# Patient Record
Sex: FEMALE | Race: WHITE | NOT HISPANIC OR LATINO | Employment: UNEMPLOYED | URBAN - NONMETROPOLITAN AREA
[De-identification: names, ages, dates, MRNs, and addresses within clinical notes are randomized per-mention and may not be internally consistent; named-entity substitution may affect disease eponyms.]

---

## 2023-11-15 ENCOUNTER — OFFICE VISIT (OUTPATIENT)
Dept: CARDIOLOGY | Facility: CLINIC | Age: 68
End: 2023-11-15
Payer: COMMERCIAL

## 2023-11-15 VITALS
DIASTOLIC BLOOD PRESSURE: 80 MMHG | SYSTOLIC BLOOD PRESSURE: 117 MMHG | HEART RATE: 88 BPM | HEIGHT: 61 IN | OXYGEN SATURATION: 96 % | BODY MASS INDEX: 24.35 KG/M2 | WEIGHT: 129 LBS

## 2023-11-15 DIAGNOSIS — R00.1 BRADYCARDIA: Primary | ICD-10-CM

## 2023-11-15 PROBLEM — C44.42 SQUAMOUS CELL CARCINOMA OF SKIN OF SCALP AND NECK: Status: ACTIVE | Noted: 2021-12-07

## 2023-11-15 PROBLEM — E78.5 DYSLIPIDEMIA: Status: ACTIVE | Noted: 2023-04-22

## 2023-11-15 PROBLEM — G43.909 MIGRAINES: Status: ACTIVE | Noted: 2023-11-15

## 2023-11-15 PROBLEM — K21.9 GASTROESOPHAGEAL REFLUX DISEASE: Status: ACTIVE | Noted: 2023-04-22

## 2023-11-15 PROBLEM — F31.9 BIPOLAR I DISORDER (MULTI): Chronic | Status: ACTIVE | Noted: 2023-09-22

## 2023-11-15 PROBLEM — N18.32 STAGE 3B CHRONIC KIDNEY DISEASE (MULTI): Status: ACTIVE | Noted: 2023-04-22

## 2023-11-15 PROBLEM — R29.6 FALLS FREQUENTLY: Status: ACTIVE | Noted: 2022-04-16

## 2023-11-15 PROBLEM — F90.2 ATTENTION DEFICIT HYPERACTIVITY DISORDER (ADHD), COMBINED TYPE: Status: ACTIVE | Noted: 2021-10-26

## 2023-11-15 PROBLEM — F41.9 ANXIETY: Status: ACTIVE | Noted: 2023-11-15

## 2023-11-15 PROBLEM — Z85.828 PERSONAL HISTORY OF OTHER MALIGNANT NEOPLASM OF SKIN: Status: ACTIVE | Noted: 2021-12-17

## 2023-11-15 PROBLEM — F32.A DEPRESSION: Status: ACTIVE | Noted: 2022-04-05

## 2023-11-15 PROBLEM — R53.1 WEAKNESS: Status: ACTIVE | Noted: 2023-05-04

## 2023-11-15 PROBLEM — R53.82 CHRONIC FATIGUE: Status: ACTIVE | Noted: 2023-11-15

## 2023-11-15 PROBLEM — I10 BENIGN ESSENTIAL HYPERTENSION: Status: ACTIVE | Noted: 2023-04-22

## 2023-11-15 PROBLEM — Z98.84 STATUS POST BARIATRIC SURGERY: Status: ACTIVE | Noted: 2021-12-07

## 2023-11-15 PROBLEM — G47.00 INSOMNIA: Status: ACTIVE | Noted: 2023-09-22

## 2023-11-15 PROCEDURE — 3079F DIAST BP 80-89 MM HG: CPT | Performed by: STUDENT IN AN ORGANIZED HEALTH CARE EDUCATION/TRAINING PROGRAM

## 2023-11-15 PROCEDURE — 99204 OFFICE O/P NEW MOD 45 MIN: CPT | Performed by: STUDENT IN AN ORGANIZED HEALTH CARE EDUCATION/TRAINING PROGRAM

## 2023-11-15 PROCEDURE — 1159F MED LIST DOCD IN RCRD: CPT | Performed by: STUDENT IN AN ORGANIZED HEALTH CARE EDUCATION/TRAINING PROGRAM

## 2023-11-15 PROCEDURE — 3074F SYST BP LT 130 MM HG: CPT | Performed by: STUDENT IN AN ORGANIZED HEALTH CARE EDUCATION/TRAINING PROGRAM

## 2023-11-15 PROCEDURE — 93010 ELECTROCARDIOGRAM REPORT: CPT | Performed by: STUDENT IN AN ORGANIZED HEALTH CARE EDUCATION/TRAINING PROGRAM

## 2023-11-15 PROCEDURE — 1036F TOBACCO NON-USER: CPT | Performed by: STUDENT IN AN ORGANIZED HEALTH CARE EDUCATION/TRAINING PROGRAM

## 2023-11-15 RX ORDER — ATORVASTATIN CALCIUM 40 MG/1
40 TABLET, FILM COATED ORAL NIGHTLY
COMMUNITY

## 2023-11-15 RX ORDER — BUPROPION HYDROCHLORIDE 150 MG/1
TABLET ORAL
COMMUNITY
Start: 2020-11-23

## 2023-11-15 RX ORDER — OMEPRAZOLE 20 MG/1
20 CAPSULE, DELAYED RELEASE ORAL
COMMUNITY
Start: 2023-06-30

## 2023-11-15 RX ORDER — CALCITRIOL 0.5 UG/1
0.5 CAPSULE ORAL
COMMUNITY
Start: 2023-11-07

## 2023-11-15 RX ORDER — TALC
POWDER (GRAM) TOPICAL
COMMUNITY
Start: 2023-04-22

## 2023-11-15 RX ORDER — FLUOXETINE HYDROCHLORIDE 40 MG/1
40 CAPSULE ORAL
COMMUNITY
Start: 2022-10-05

## 2023-11-15 RX ORDER — ONDANSETRON 4 MG/1
4 TABLET, ORALLY DISINTEGRATING ORAL EVERY 6 HOURS PRN
COMMUNITY
Start: 2023-05-26

## 2023-11-15 RX ORDER — CARIPRAZINE 1.5 MG/1
1.5 CAPSULE, GELATIN COATED ORAL NIGHTLY
COMMUNITY

## 2023-11-15 RX ORDER — AMLODIPINE BESYLATE 10 MG/1
10 TABLET ORAL DAILY
COMMUNITY

## 2023-11-15 RX ORDER — DIAZEPAM 5 MG/1
1 TABLET ORAL EVERY 12 HOURS PRN
COMMUNITY
Start: 2023-06-12

## 2023-11-15 RX ORDER — DEXTROAMPHETAMINE SACCHARATE, AMPHETAMINE ASPARTATE, DEXTROAMPHETAMINE SULFATE AND AMPHETAMINE SULFATE 2.5; 2.5; 2.5; 2.5 MG/1; MG/1; MG/1; MG/1
10 TABLET ORAL 2 TIMES DAILY
COMMUNITY
Start: 2023-11-06 | End: 2023-11-15

## 2023-11-15 RX ORDER — IBANDRONATE SODIUM 150 MG/1
1 TABLET, FILM COATED ORAL
COMMUNITY
Start: 2023-11-07

## 2023-11-15 NOTE — PATIENT INSTRUCTIONS
It was nice meeting you today  Please repeat echo after 2 years for the aortic regurgitation (leakage in the aortic valve)

## 2023-11-15 NOTE — PROGRESS NOTES
Primary Care Physician: Musa Rodríguez MD   Date of Visit: 11/15/2023  2:20 PM EST  Type of Visit: new      Chief Complaint:  No chief complaint on file.       HPI  Addie Ortiz 68 y.o. female with past medical history of GERD presents today for abnormal echo showing moderate AI per report.  Per report it has been unchanged from prior.       No dizziness or syncope  No chest pain   No sob  No angina  No le edema  No ortohpnea or pnd   Exsmoker quit 20 years ago  No alcohol       Review of Systems   Review of Systems   12 points review of systems are negative expect for the above    Social History:  Social History     Socioeconomic History    Marital status:      Spouse name: Not on file    Number of children: Not on file    Years of education: Not on file    Highest education level: Not on file   Occupational History    Not on file   Tobacco Use    Smoking status: Not on file    Smokeless tobacco: Not on file   Substance and Sexual Activity    Alcohol use: Not on file    Drug use: Not on file    Sexual activity: Not on file   Other Topics Concern    Not on file   Social History Narrative    Not on file     Social Determinants of Health     Financial Resource Strain: Not on file   Food Insecurity: Not on file   Transportation Needs: Not on file   Physical Activity: Not on file   Stress: Not on file   Social Connections: Not on file   Intimate Partner Violence: Not on file   Housing Stability: Not on file        Past Medical History:  Past Medical History:   Diagnosis Date    Encounter for general adult medical examination without abnormal findings 06/30/2021    Visit for preventive health examination    Personal history of other diseases of the musculoskeletal system and connective tissue     History of joint pain    Personal history of other diseases of the respiratory system 11/16/2021    History of allergic rhinitis    Personal history of other specified conditions 04/14/2021    History of  "chronic fatigue       Past Surgical History:  Past Surgical History:   Procedure Laterality Date    OTHER SURGICAL HISTORY  03/15/2021    Tubal ligation    OTHER SURGICAL HISTORY  03/15/2021    Hysterectomy    OTHER SURGICAL HISTORY  03/15/2021    Gastric bypass surgery       Family History:  No family history on file.     Objective:       6/30/2021     4:29 PM 7/29/2021    11:34 AM 8/30/2021    11:04 AM 9/16/2021     2:40 PM 11/16/2021    10:04 AM 11/30/2021     2:56 PM 12/7/2021    10:56 AM   Vitals   Systolic 142 138 132 142 136 132 110   Diastolic 82 88 86 94 84 78 76   Heart Rate 62 92 102 104 106 86 90   Temp       36.5 °C (97.7 °F)   Resp       16   Height (in) 1.568 m (5' 1.75\") 1.568 m (5' 1.75\") 1.568 m (5' 1.75\") 1.568 m (5' 1.75\") 1.568 m (5' 1.75\") 1.568 m (5' 1.75\") 1.532 m (5' 0.32\")   Weight (lb) 162.13 162 163 166 172 172 169.97   BMI 29.89 kg/m2 29.87 kg/m2 30.05 kg/m2 30.61 kg/m2 31.71 kg/m2 31.71 kg/m2 32.85 kg/m2   BSA (m2) 1.79 m2 1.79 m2 1.79 m2 1.81 m2 1.84 m2 1.84 m2 1.81 m2      Constitutional:       Appearance: Healthy appearance. Not in distress.   Neck:      Vascular: No JVR. JVD normal.   Pulmonary:      Effort: Pulmonary effort is normal.      Breath sounds: Normal breath sounds. No wheezing. No rhonchi. No rales.   Chest:      Chest wall: Not tender to palpatation.   Cardiovascular:      PMI at left midclavicular line. Normal rate. Regular rhythm. Normal S1. Normal S2.       Murmurs: There is no murmur.      No gallop.  No click. No rub.   Pulses:     Intact distal pulses.   Edema:     Peripheral edema absent.   Abdominal:      General: Bowel sounds are normal.      Palpations: Abdomen is soft.      Tenderness: There is no abdominal tenderness.   Musculoskeletal: Normal range of motion.         General: No tenderness.   Skin:     General: Skin is warm and dry.   Neurological:      General: No focal deficit present.      Mental Status: Alert and oriented to person, place and time. "     Allergies:  Not on File    Medications:  No current outpatient medications     Labs and Imaging:     Lab Results   Component Value Date    WBC 13.7 (H) 08/25/2022    HGB 11.4 (L) 08/25/2022    HCT 34.4 (L) 08/25/2022     08/25/2022    ALT 18 07/22/2021    AST 20 07/22/2021     08/25/2022    K 4.4 08/25/2022     08/25/2022    CREATININE 1.2 08/25/2022    BUN 13 08/25/2022    CO2 24 08/25/2022    TSH 1.87 07/22/2021         Echocardiogram: No results found for this or any previous visit from the past 1825 days.    Stress Testing: No results found for this or any previous visit from the past 1825 days.    Cardiac Catheterization: No results found for this or any previous visit from the past 1825 days.    Cardiac Scoring: No results found for this or any previous visit from the past 1825 days.    AAA : No results found for this or any previous visit from the past 1825 days.    OTHER: No results found for this or any previous visit from the past 1825 days.      CTA from [facility]  Heart, pericardium, and thoracic vessels:   Stable ectasia of ascending aorta measuring up to 3.9 cm descending   thoracic aorta is normal in caliber.  No abnormal dilatation of main   pulmonary artery.  Heart is not enlarged.  Calcification of coronary   artery.  No pericardial effusion.  Calcification of aortic arch.       AAA screen  RESULT:     AORTA (AP x TV):        Proximal: 2.5 cm x 2.8 cm        Mid (level of renal arteries): 2.0 cm x 2.3 cm        Distal: 1.4 cm x 1.8 cm     Common Iliac Arteries (AP x TV):        Right: 1.2 cm x 1.3 cm        Left: 1.1 cm x 1.1 cm     Atherosclerotic plaque: present       The ASCVD Risk score (Amanda DK, et al., 2019) failed to calculate for the following reasons:    Cannot find a previous HDL lab    Cannot find a previous total cholesterol lab    The smoking status is invalid     Assessment/Plan:   No diagnosis found.   Addie Angel 68 y.o. female with past medical history of  GERD, HTN, ADHD, depression, anxiety, exsmoker, s/p left nephrectomy and gastric bypass surgery presents today for moderate AI per echo has been stable over past 2 echos, trileaflet valve, no aortic stenosis, normal aorta size. EF 60-65, normal LV dimensions.  (No images)  CT chest shows ascending aorta upper limit of normal 3.9, coronary calcification.     She is asymptomatic   Normal BP and normal pulse pressure     Plan  Agree with HI Statin  Repeat echo after 2 years, she will call after 2 years or follow up with her PCP, she is not sure yet     Time Spent: I spent minutes reviewing medical testing, obtaining medical history and counselling and educating on diagnosis and documenting clinical encounter.         ____________________________________________________________  Gavin Huang MD   of Medicine  Division of Cardiovascular Medicine   [facility] [place]  [facility]

## 2023-11-16 ENCOUNTER — HOSPITAL ENCOUNTER (OUTPATIENT)
Dept: CARDIOLOGY | Facility: HOSPITAL | Age: 68
Discharge: HOME | End: 2023-11-16
Payer: MEDICARE

## 2023-11-16 DIAGNOSIS — R00.1 BRADYCARDIA: ICD-10-CM

## 2023-11-16 PROCEDURE — 93005 ELECTROCARDIOGRAM TRACING: CPT

## 2023-11-22 LAB
ATRIAL RATE: 86 BPM
P AXIS: 46 DEGREES
P OFFSET: 202 MS
P ONSET: 156 MS
PR INTERVAL: 128 MS
Q ONSET: 220 MS
QRS COUNT: 14 BEATS
QRS DURATION: 82 MS
QT INTERVAL: 362 MS
QTC CALCULATION(BAZETT): 433 MS
QTC FREDERICIA: 408 MS
R AXIS: 74 DEGREES
T AXIS: 74 DEGREES
T OFFSET: 401 MS
VENTRICULAR RATE: 86 BPM

## 2024-07-16 ENCOUNTER — APPOINTMENT (OUTPATIENT)
Dept: UROLOGY | Facility: CLINIC | Age: 69
End: 2024-07-16
Payer: MEDICARE

## 2024-07-31 ENCOUNTER — APPOINTMENT (OUTPATIENT)
Dept: UROLOGY | Facility: CLINIC | Age: 69
End: 2024-07-31
Payer: MEDICARE

## 2024-07-31 VITALS — BODY MASS INDEX: 24.35 KG/M2 | HEIGHT: 61 IN | WEIGHT: 129 LBS

## 2024-07-31 DIAGNOSIS — C64.2 MALIGNANT NEOPLASM OF LEFT KIDNEY (MULTI): Primary | ICD-10-CM

## 2024-07-31 PROCEDURE — 1126F AMNT PAIN NOTED NONE PRSNT: CPT | Performed by: SURGERY

## 2024-07-31 PROCEDURE — 99203 OFFICE O/P NEW LOW 30 MIN: CPT | Performed by: SURGERY

## 2024-07-31 PROCEDURE — 1036F TOBACCO NON-USER: CPT | Performed by: SURGERY

## 2024-07-31 PROCEDURE — 1159F MED LIST DOCD IN RCRD: CPT | Performed by: SURGERY

## 2024-07-31 PROCEDURE — 3008F BODY MASS INDEX DOCD: CPT | Performed by: SURGERY

## 2024-07-31 RX ORDER — TRETINOIN 0.5 MG/G
CREAM TOPICAL
COMMUNITY
Start: 2024-04-29

## 2024-07-31 RX ORDER — TRAZODONE HYDROCHLORIDE 100 MG/1
100 TABLET ORAL NIGHTLY
COMMUNITY
Start: 2024-07-05

## 2024-07-31 RX ORDER — TRIAMCINOLONE ACETONIDE 1 MG/G
OINTMENT TOPICAL
COMMUNITY
Start: 2024-06-10

## 2024-07-31 RX ORDER — TOPIRAMATE 50 MG/1
1 TABLET, FILM COATED ORAL
COMMUNITY
Start: 2024-07-19

## 2024-07-31 RX ORDER — MONTELUKAST SODIUM 10 MG/1
10 TABLET ORAL NIGHTLY
COMMUNITY
Start: 2023-09-25

## 2024-07-31 RX ORDER — DEXTROAMPHETAMINE SACCHARATE, AMPHETAMINE ASPARTATE, DEXTROAMPHETAMINE SULFATE AND AMPHETAMINE SULFATE 2.5; 2.5; 2.5; 2.5 MG/1; MG/1; MG/1; MG/1
1 TABLET ORAL
COMMUNITY
Start: 2024-07-05

## 2024-07-31 ASSESSMENT — PAIN SCALES - GENERAL: PAINLEVEL: 0-NO PAIN

## 2024-07-31 NOTE — PROGRESS NOTES
Subjective   Patient ID: Addie Ortiz is a 68 y.o. female who presents for Renal Cancer.    HPI  She is well-known to me from my previous practice.  She has a history of left-sided renal cell carcinoma.  She had a radical nephrectomy in August 2022.  Her final stage was T1b.  Since then she has been doing reasonably well.  She has been depressed due to the recent loss of her .  She also has been having issues with balance.  She is addressing this with her primary doctor as well as neurology.  She reports a stable weight.  She denies any flank or abdominal pain.    Review of Systems  As per HPI, remaining 10 systems negative      Objective   Physical Exam  Well nourished  Breathing comfortably  Mildly obese, mid line scar  Abdomen soft, ND, NT            Assessment/Plan   Problem List Items Addressed This Visit    None  Visit Diagnoses         Codes    Malignant neoplasm of left kidney (Multi)    -  Primary C64.2    Relevant Orders    CBC    Comprehensive metabolic panel    CT chest abdomen pelvis wo IV contrast    Follow Up In Urology          Renal cell carcinoma.     Clinically she is stable.  I reviewed her most recent labs.  Her creatinine is only mildly elevated but stable.  She is mildly anemic, but attributes this to iron deficiency.  Her liver function is normal.  She is due for surveillance imaging.  I have ordered a CT chest abdomen and pelvis today.  I will call her with the results.    She will return and see me in 6 months time.        Davey Ruff MD 07/31/24 3:49 PM

## 2025-01-20 ENCOUNTER — HOSPITAL ENCOUNTER (OUTPATIENT)
Dept: RADIOLOGY | Facility: HOSPITAL | Age: 70
Discharge: HOME | End: 2025-01-20
Payer: MEDICARE

## 2025-01-20 ENCOUNTER — HOSPITAL ENCOUNTER (EMERGENCY)
Facility: HOSPITAL | Age: 70
Discharge: HOME | End: 2025-01-20
Attending: EMERGENCY MEDICINE
Payer: MEDICARE

## 2025-01-20 VITALS
BODY MASS INDEX: 26.7 KG/M2 | OXYGEN SATURATION: 95 % | DIASTOLIC BLOOD PRESSURE: 98 MMHG | SYSTOLIC BLOOD PRESSURE: 179 MMHG | HEIGHT: 60 IN | RESPIRATION RATE: 18 BRPM | HEART RATE: 66 BPM | TEMPERATURE: 96.8 F | WEIGHT: 136 LBS

## 2025-01-20 DIAGNOSIS — R52 PAIN, UNSPECIFIED: ICD-10-CM

## 2025-01-20 DIAGNOSIS — I80.8 SUPERFICIAL THROMBOPHLEBITIS OF RIGHT UPPER EXTREMITY: Primary | ICD-10-CM

## 2025-01-20 DIAGNOSIS — M79.89 PAIN AND SWELLING OF FOREARM, RIGHT: ICD-10-CM

## 2025-01-20 DIAGNOSIS — M79.631 PAIN AND SWELLING OF FOREARM, RIGHT: ICD-10-CM

## 2025-01-20 PROCEDURE — 93971 EXTREMITY STUDY: CPT | Performed by: RADIOLOGY

## 2025-01-20 PROCEDURE — 93971 EXTREMITY STUDY: CPT

## 2025-01-20 PROCEDURE — 99283 EMERGENCY DEPT VISIT LOW MDM: CPT | Performed by: EMERGENCY MEDICINE

## 2025-01-20 RX ORDER — OXYCODONE HYDROCHLORIDE 5 MG/1
5 TABLET ORAL EVERY 6 HOURS PRN
Qty: 15 TABLET | Refills: 0 | Status: SHIPPED | OUTPATIENT
Start: 2025-01-20 | End: 2025-01-27

## 2025-01-20 ASSESSMENT — PAIN DESCRIPTION - PAIN TYPE: TYPE: ACUTE PAIN

## 2025-01-20 ASSESSMENT — PAIN DESCRIPTION - LOCATION
LOCATION: ARM
LOCATION: ARM

## 2025-01-20 ASSESSMENT — COLUMBIA-SUICIDE SEVERITY RATING SCALE - C-SSRS
1. IN THE PAST MONTH, HAVE YOU WISHED YOU WERE DEAD OR WISHED YOU COULD GO TO SLEEP AND NOT WAKE UP?: NO
2. HAVE YOU ACTUALLY HAD ANY THOUGHTS OF KILLING YOURSELF?: NO
6. HAVE YOU EVER DONE ANYTHING, STARTED TO DO ANYTHING, OR PREPARED TO DO ANYTHING TO END YOUR LIFE?: NO

## 2025-01-20 ASSESSMENT — PAIN SCALES - GENERAL
PAINLEVEL_OUTOF10: 5 - MODERATE PAIN
PAINLEVEL_OUTOF10: 7

## 2025-01-20 ASSESSMENT — PAIN DESCRIPTION - ORIENTATION
ORIENTATION: RIGHT
ORIENTATION: RIGHT

## 2025-01-20 ASSESSMENT — PAIN - FUNCTIONAL ASSESSMENT
PAIN_FUNCTIONAL_ASSESSMENT: 0-10
PAIN_FUNCTIONAL_ASSESSMENT: 0-10

## 2025-01-20 NOTE — DISCHARGE INSTRUCTIONS
Oxycodone for pain.    Warm compresses or soak in a warm tub to increase circulation to the arm.    Call your primary care provider tomorrow morning for further evaluation and care.  After learning about your recent upper GI bleed and a hemoglobin dropping to 5 it is too risky to start on Eliquis at this time.  Please consult your doctor for other treatment options.

## 2025-01-20 NOTE — ED PROVIDER NOTES
[facility]  ED  Provider Note  1/20/2025  6:41 PM  [id number]              Chief Complaint   Patient presents with    Arm Injury     Right arm pain for 2 weeks, pt said it started after her hospital discharge         History of Present Illness:   Addie Ortiz is a 69 y.o. female presenting to the ED for superficial thrombophlebitis of the cephalic vein of the right arm, beginning 2 weeks ago after being discharged in the hospital.  The complaint has been persistent, severe in severity, and worsened by movement and touching.  Patient has significant pain from a cephalic vein thrombophlebitis.  This is at the site she had an IV 2 weeks ago.  She was hospitalized for an upper GI bleed with a hemoglobin of 5.        Review of Systems:   Pertinent positives and review of systems as noted above.  Remaining 10 review of systems is negative or noncontributory to today's episode of care.  Review of Systems       --------------------------------------------- PAST HISTORY ---------------------------------------------  Past Medical History:   Diagnosis Date    DVT (deep venous thrombosis) (Multi) 01/20/2025    right arm    Encounter for general adult medical examination without abnormal findings 06/30/2021    Visit for preventive health examination    Personal history of other diseases of the musculoskeletal system and connective tissue     History of joint pain    Personal history of other diseases of the respiratory system 11/16/2021    History of allergic rhinitis    Personal history of other specified conditions 04/14/2021    History of chronic fatigue         has a past surgical history that includes Other surgical history (03/15/2021); Other surgical history (03/15/2021); and Other surgical history (03/15/2021).     reports that she has quit smoking. Her smoking use included cigarettes. She has never used smokeless tobacco. She reports that she does not drink alcohol and does not use drugs.    family  history is not on file. Unless otherwise noted, family history is non contributory    Patient's Medications   New Prescriptions    No medications on file   Previous Medications    AMLODIPINE (NORVASC) 10 MG TABLET    Take 1 tablet (10 mg) by mouth once daily.    AMPHETAMINE-DEXTROAMPHETAMINE (ADDERALL) 10 MG TABLET    Take 1 tablet (10 mg) by mouth every 12 hours.    ATORVASTATIN (LIPITOR) 40 MG TABLET    Take 1 tablet (40 mg) by mouth once daily at bedtime.    BUPROPION XL (WELLBUTRIN XL) 150 MG 24 HR TABLET    Take by mouth.    CALCITRIOL (ROCALTROL) 0.5 MCG CAPSULE    Take 1 capsule (0.5 mcg) by mouth once daily.    DIAZEPAM (VALIUM) 5 MG TABLET    Take 1 tablet (5 mg) by mouth every 12 hours if needed.    FLUOXETINE (PROZAC) 40 MG CAPSULE    Take 1 capsule (40 mg) by mouth once daily.    IBANDRONATE (BONIVA) 150 MG TABLET    Take 1 tablet (150 mg) by mouth every 30 (thirty) days.    MELATONIN 3 MG TABLET    take 3 tablets by mouth daily at bedtime    MONTELUKAST (SINGULAIR) 10 MG TABLET    Take 1 tablet (10 mg) by mouth once daily at bedtime.    OMEPRAZOLE (PRILOSEC) 20 MG DR CAPSULE    Take 1 capsule (20 mg) by mouth once daily.    ONDANSETRON ODT (ZOFRAN-ODT) 4 MG DISINTEGRATING TABLET    Take 1 tablet (4 mg) by mouth every 6 hours if needed.    TOPIRAMATE (TOPAMAX) 50 MG TABLET    Take 1 tablet (50 mg) by mouth every 12 hours.    TRAZODONE (DESYREL) 100 MG TABLET    Take 1 tablet (100 mg) by mouth once daily at bedtime.    TRETINOIN (RETIN-A) 0.05 % CREAM    APPLY 1 APPLICATION TOPICALLY TO FULL FACE AND NECK ONCE NIGHTLY AS TOLERATED    TRIAMCINOLONE (KENALOG) 0.1 % OINTMENT    Apply one application to affected area of l upper arm twice daily for 2 weeks, stop for 7 days, repeat as needed for itching or redness, avoid face    VRAYLAR 1.5 MG CAPSULE    Take 1 capsule (1.5 mg) by mouth once daily at bedtime.   Modified Medications    No medications on file   Discontinued Medications    No medications on file       The patient’s home medications have been reviewed.    Allergies: Patient has no known allergies.    -------------------------------------------------- RESULTS -------------------------------------------------  All laboratory and radiology results have been personally reviewed by myself   LABS:  Labs Reviewed - No data to display    EKG:     RADIOLOGY:  Interpreted by Radiologist.  No orders to display       ------------------------- NURSING NOTES AND VITALS REVIEWED ---------------------------   The nursing notes within the ED encounter and vital signs as below have been reviewed.   BP (!) 177/106   Pulse 69   Temp 35.9 °C (96.6 °F) (Temporal)   Resp 18   Ht 1.524 m (5')   Wt 61.7 kg (136 lb)   BMI 26.56 kg/m²   Oxygen Saturation Interpretation: Normal      ---------------------------------------------------PHYSICAL EXAM--------------------------------------  Physical Exam   Constitutional/General: Alert and oriented x3, well appearing, non toxic in NAD  Head: Normocephalic and atraumatic  Eyes: PERRL, EOMI, conjunctiva normal, sclera non icteric  Mouth: Oropharynx clear, handling secretions, no trismus, no asymmetry of the posterior oropharynx or uvular edema  Neck: Supple, full ROM, non tender to palpation in the midline, no stridor, no crepitus, no meningeal signs  Respiratory: Lungs clear to auscultation bilaterally, no wheezes, rales, or rhonchi  Cardiovascular:  Regular rate. Regular rhythm. No murmurs, gallops, or rubs. 2+ distal pulses, significant tenderness to palpation over the right cephalic vein from the wrist up to the proximal bicep.  Chest: No chest wall tenderness  GI:  Abdomen Soft, Non tender, Non distended.  +BS. No organomegaly, no palpable masses,  No rebound, guarding, or rigidity. No CVAT   Musculoskeletal: Moves all extremities x 4. Warm and well perfused, no clubbing, cyanosis, or edema. Capillary refill <3 seconds  Integument: skin warm and dry. No rashes.   Lymphatic: no  lymphadenopathy noted  Neurologic:  No focal deficits, symmetric strength 5/5 in the upper and lower extremities bilaterally  Psychiatric: Normal Affect    Procedures    Diagnoses as of 01/20/25 1859   Superficial thrombophlebitis of right upper extremity          Medical Decision Making:   Patient had ultrasound prior to ED arrival showing a superficial thrombophlebitis of the right cephalic vein from the wrist to the proximal arm.  She has significant pain.  There is no evidence of a DVT on today's ultrasound.  Given her recent life-threatening GI bleed I have decided not to place the patient on Eliquis.  I have consulted the nurse practitioner on-call for the group who suggested Eliquis without this knowledge about the upper GI bleed.  I will provide oxycodone for pain and advised she follow-up with her doctor tomorrow morning for further evaluation and treatment.      Counseling:   The emergency provider has spoken with the patient and discussed today’s results, in addition to providing specific details for the plan of care and counseling regarding the diagnosis and prognosis.  Questions are answered at this time and they are agreeable with the plan.      --------------------------------- IMPRESSION AND DISPOSITION ---------------------------------        IMPRESSION  1. Superficial thrombophlebitis of right upper extremity        DISPOSITION  Disposition: Discharge to home  Patient condition is fair      Billing Provider Critical Care Time: 0 minutes     Elliot Brody MD  01/20/25 2188

## 2025-01-21 LAB — BODY SURFACE AREA: 1.62 M2

## 2025-01-27 ENCOUNTER — APPOINTMENT (OUTPATIENT)
Dept: RADIOLOGY | Facility: HOSPITAL | Age: 70
End: 2025-01-27
Payer: MEDICARE

## 2025-01-30 ENCOUNTER — APPOINTMENT (OUTPATIENT)
Dept: RADIOLOGY | Facility: HOSPITAL | Age: 70
End: 2025-01-30
Payer: MEDICARE

## 2025-01-31 ENCOUNTER — HOSPITAL ENCOUNTER (OUTPATIENT)
Dept: RADIOLOGY | Facility: HOSPITAL | Age: 70
Discharge: HOME | End: 2025-01-31
Payer: MEDICARE

## 2025-01-31 DIAGNOSIS — C64.2 MALIGNANT NEOPLASM OF LEFT KIDNEY (MULTI): ICD-10-CM

## 2025-01-31 PROCEDURE — 74176 CT ABD & PELVIS W/O CONTRAST: CPT

## 2025-02-04 ENCOUNTER — APPOINTMENT (OUTPATIENT)
Dept: UROLOGY | Facility: CLINIC | Age: 70
End: 2025-02-04
Payer: MEDICARE

## 2025-02-07 ENCOUNTER — APPOINTMENT (OUTPATIENT)
Dept: UROLOGY | Facility: CLINIC | Age: 70
End: 2025-02-07
Payer: MEDICARE

## 2025-03-04 ENCOUNTER — APPOINTMENT (OUTPATIENT)
Dept: UROLOGY | Facility: CLINIC | Age: 70
End: 2025-03-04
Payer: MEDICARE

## 2025-03-13 ENCOUNTER — APPOINTMENT (OUTPATIENT)
Dept: UROLOGY | Facility: CLINIC | Age: 70
End: 2025-03-13
Payer: MEDICARE

## 2025-04-30 ENCOUNTER — APPOINTMENT (OUTPATIENT)
Dept: UROLOGY | Facility: CLINIC | Age: 70
End: 2025-04-30
Payer: MEDICARE

## 2025-04-30 DIAGNOSIS — C64.2 RENAL CELL CARCINOMA OF LEFT KIDNEY: Primary | ICD-10-CM

## 2025-04-30 PROCEDURE — 99213 OFFICE O/P EST LOW 20 MIN: CPT | Performed by: SURGERY

## 2025-04-30 PROCEDURE — 1160F RVW MEDS BY RX/DR IN RCRD: CPT | Performed by: SURGERY

## 2025-04-30 PROCEDURE — 1126F AMNT PAIN NOTED NONE PRSNT: CPT | Performed by: SURGERY

## 2025-04-30 PROCEDURE — 1036F TOBACCO NON-USER: CPT | Performed by: SURGERY

## 2025-04-30 PROCEDURE — 1159F MED LIST DOCD IN RCRD: CPT | Performed by: SURGERY

## 2025-04-30 ASSESSMENT — PAIN SCALES - GENERAL: PAINLEVEL_OUTOF10: 0-NO PAIN

## 2025-04-30 NOTE — PROGRESS NOTES
Subjective   Patient ID: Addie Ortiz is a 69 y.o. female who presents for Follow-up.    HPI  She has a history of left-sided renal cell carcinoma diagnosed in 2022.  She underwent a left radical nephrectomy.  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  Today she is doing well.  She has no new complaints.  Her weight has been stable.  She did lose her  about 18 months ago, she is slowly recovering from that.  She denies any abdominal or flank pain.  She denies any hematuria.              Objective   Physical Exam  Well nourished  Breathing comfortably  Abdomen soft, ND, NT                    Assessment/Plan   Problem List Items Addressed This Visit    None  Visit Diagnoses         Codes      Renal cell carcinoma of left kidney    -  Primary C64.2             I reviewed her recent surveillance imaging.  I reviewed her CT chest abdomen and pelvis.  There is no evidence of any recurrent or metastatic disease.  Her labs are all acceptable.  Creatinine is 1.4.  Clinically she is doing well.  She will return and see me in 6 months.            Davey Ruff MD 04/30/25 1:27 PM

## 2025-05-02 DIAGNOSIS — Z98.84 STATUS POST BARIATRIC SURGERY: ICD-10-CM
